# Patient Record
Sex: FEMALE
[De-identification: names, ages, dates, MRNs, and addresses within clinical notes are randomized per-mention and may not be internally consistent; named-entity substitution may affect disease eponyms.]

---

## 2022-08-13 ENCOUNTER — APPOINTMENT (OUTPATIENT)
Dept: OTOLARYNGOLOGY | Facility: CLINIC | Age: 37
End: 2022-08-13

## 2022-08-13 DIAGNOSIS — J04.0 ACUTE LARYNGITIS: ICD-10-CM

## 2022-08-13 DIAGNOSIS — R49.0 DYSPHONIA: ICD-10-CM

## 2022-08-13 DIAGNOSIS — K21.9 GASTRO-ESOPHAGEAL REFLUX DISEASE W/OUT ESOPHAGITIS: ICD-10-CM

## 2022-08-13 PROCEDURE — 99050 MEDICAL SERVICES AFTER HRS: CPT

## 2022-08-13 PROCEDURE — 31579 LARYNGOSCOPY TELESCOPIC: CPT

## 2022-08-13 PROCEDURE — 99205 OFFICE O/P NEW HI 60 MIN: CPT | Mod: 25

## 2022-10-11 PROBLEM — Z00.00 ENCOUNTER FOR PREVENTIVE HEALTH EXAMINATION: Status: ACTIVE | Noted: 2022-10-11

## 2022-10-26 PROBLEM — K21.9 LARYNGOPHARYNGEAL REFLUX (LPR): Status: ACTIVE | Noted: 2022-10-26

## 2022-10-26 PROBLEM — J04.0 LARYNGITIS: Status: RESOLVED | Noted: 2022-10-26 | Resolved: 2022-11-25

## 2022-10-26 PROBLEM — R49.0 DYSPHONIA: Status: ACTIVE | Noted: 2022-10-26

## 2022-10-26 NOTE — ASSESSMENT
[FreeTextEntry1] : 37-year-old female who presents with concern for dysphonia.  On exam today there is evidence of some hoarse voice and reduced range.  Stroboscopy is consistent with mid vocal fold edema bilaterally, worse on the left.  There is also evidence of postcricoid edema and erythema of the vocal process consistent with laryngopharyngeal reflux.\par \par At this time I am recommending dietary and behavioral change to reduce acid in the diet.  I am also recommending she continue with her oral antireflux medication.  We also had a lengthy discussion regarding voice hygiene, increased water intake, voice rest when able, avoid smoke inhalation.  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    I reviewed the risks benefits and alternatives of oral steroids at length.  I also recommend following up with her vocal  and potentially speech pathologist.   Patient and manager will get the rest of any updates or changes in terms of symptomatology.\par \par –Oral steroids\par –Dietary and behavioral modification to reduce acid reflux, handout given\par –continue antireflux medications\par - Voice hygiene, increase hydration, sips of water throughout the day, avoid throat clearing\par - fu as needed,  due to travel\par

## 2022-10-26 NOTE — PROCEDURE
[de-identified] : -\par Procedure Note\par \par Pre-operative Diagnosis:  dysphonia\par Post-operative Diagnosis:  laryngitis, left saccular cyst false vocal fold\par Anesthesia: Topical - 1 % Lidocaine/Phenylephrine\par Procedure:  Flexible Laryngoscopy with Stroboscopy\par  \par Procedure Details:  \par The patient was placed in the sitting position.  After decongestant and anesthesia were applied the laryngoscope was passed.  The nasal cavities, nasopharynx, oropharynx, hypopharynx, and larynx were all examined.  Vocal folds were examined during respiration and phonation.  The following findings were noted:\par \par Findings:  \par Nose: Septum is midline, turbinates are normal, nasal airways patent, mucosa normal\par Nasopharynx: Adenoids normal, no masses, eustachian tube normal\par Oropharynx: Pharyngeal walls symmetric and without lesion. Tonsils/fossae symmetric\par Hypopharynx: Hypopharynx and pyriform sinuses without lesion. No masses or asymmetry.  No pooling of secretions.\par Larynx:  Epiglottis and aryepiglottic folds were sharp and crisp bilaterally.  Bilateral false vocal folds normal appearance. + Small saccular cyst of the left false vocal fold.  Airway was widely patent. + postcricoid edema, erythema vocal process bilaterally\par \par Strobe Exam Ratings\par 		\par TVF Appearance:  normal\par TVF Mobility:  normal\par Edema/hypertrophy:  + mid fold left worse than right\par Mucus on TVF:  normal\par Glottic Closure:  complete\par Mucosal Wave:  slightly reduced\par Amplitude of Vibration:  slightly reduced\par Phase:  symmetric\par Supraglottic Hyperfunction:  none\par Other Findings:\par \par Condition: Stable.  Patient tolerated procedure well.\par \par Complications: None\par \par

## 2022-10-26 NOTE — HISTORY OF PRESENT ILLNESS
[de-identified] :  37-year-old female who presents with concern for dysphonia.   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    She will perform 3 to 4 hours a night, 2-3 nights per week.   This is done primarily without any significant breaks.    Over the past few weeks she has noted progressive hoarseness and dysphonia.  The symptoms have been fairly persistent.  She feels that she is not able to attain normal range.  She denies any pitch breaks or difficulty with projection.  She will stay well-hydrated during her performances however she performs with a backdrop of flames and latex outfits that can lead to sweating and volume loss.    Additionally 2 weeks ago she did have an upper respiratory infection with associated symptoms which have also caused some level of dysphonia and hoarse voice.  She has been able to perform through this.\par \par   In the past she has had voice issues.  She does follow with a laryngologist in [place].  She understands voice hygiene however does not always follow these rules.  Recently there have been evenings with alcohol use  as well as inhalation of marijuana.  She has well-known laryngopharyngeal reflux but does continue to consume acid producing foods including 2 pots of coffee daily.\par \par

## 2022-10-26 NOTE — PHYSICAL EXAM
[FreeTextEntry1] :   Mild hoarseness, reduced range, normal pitch control and projection, strong cough [] : septum deviated bilaterally [Midline] : trachea located in midline position [Normal] : no rashes

## 2023-07-28 ENCOUNTER — IMAGING SERVICES (OUTPATIENT)
Dept: ULTRASOUND IMAGING | Age: 38
End: 2023-07-28
Attending: OBSTETRICS & GYNECOLOGY

## 2023-07-28 ENCOUNTER — OFFICE VISIT (OUTPATIENT)
Dept: ENDOCRINOLOGY | Age: 38
End: 2023-07-28

## 2023-07-28 DIAGNOSIS — N97.9 FEMALE INFERTILITY: ICD-10-CM

## 2023-07-28 DIAGNOSIS — N97.9 FEMALE INFERTILITY: Primary | ICD-10-CM

## 2023-07-28 LAB — ESTRADIOL SERPL-MCNC: 678 PG/ML

## 2023-07-28 PROCEDURE — 82670 ASSAY OF TOTAL ESTRADIOL: CPT | Performed by: INTERNAL MEDICINE

## 2023-07-28 PROCEDURE — 36415 COLL VENOUS BLD VENIPUNCTURE: CPT | Performed by: OBSTETRICS & GYNECOLOGY

## 2023-07-28 PROCEDURE — 76830 TRANSVAGINAL US NON-OB: CPT | Performed by: OBSTETRICS & GYNECOLOGY
